# Patient Record
Sex: FEMALE | Race: BLACK OR AFRICAN AMERICAN
[De-identification: names, ages, dates, MRNs, and addresses within clinical notes are randomized per-mention and may not be internally consistent; named-entity substitution may affect disease eponyms.]

---

## 2017-05-04 ENCOUNTER — HOSPITAL ENCOUNTER (EMERGENCY)
Dept: HOSPITAL 74 - JER | Age: 37
Discharge: LEFT BEFORE DISCHARGE | End: 2017-05-04
Payer: COMMERCIAL

## 2017-05-04 VITALS — BODY MASS INDEX: 38.4 KG/M2

## 2017-05-04 VITALS — HEART RATE: 67 BPM | DIASTOLIC BLOOD PRESSURE: 112 MMHG | TEMPERATURE: 98 F | SYSTOLIC BLOOD PRESSURE: 150 MMHG

## 2017-05-04 DIAGNOSIS — Z53.21: Primary | ICD-10-CM

## 2017-10-08 ENCOUNTER — HOSPITAL ENCOUNTER (EMERGENCY)
Dept: HOSPITAL 74 - JERFT | Age: 37
Discharge: HOME | End: 2017-10-08
Payer: COMMERCIAL

## 2017-10-08 VITALS — HEART RATE: 77 BPM | TEMPERATURE: 98.1 F | DIASTOLIC BLOOD PRESSURE: 63 MMHG | SYSTOLIC BLOOD PRESSURE: 111 MMHG

## 2017-10-08 VITALS — BODY MASS INDEX: 34 KG/M2

## 2017-10-08 DIAGNOSIS — S13.4XXA: Primary | ICD-10-CM
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
## 2017-10-08 DIAGNOSIS — Z98.84: ICD-10-CM
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
## 2017-10-08 PROCEDURE — 3E0233Z INTRODUCTION OF ANTI-INFLAMMATORY INTO MUSCLE, PERCUTANEOUS APPROACH: ICD-10-PCS
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
## 2021-09-14 ENCOUNTER — HOSPITAL ENCOUNTER (INPATIENT)
Dept: HOSPITAL 74 - JER | Age: 41
LOS: 10 days | Discharge: HOME | DRG: 759 | End: 2021-09-24
Attending: INTERNAL MEDICINE | Admitting: HOSPITALIST
Payer: COMMERCIAL

## 2021-09-14 VITALS — BODY MASS INDEX: 38.5 KG/M2

## 2021-09-14 DIAGNOSIS — K80.20: ICD-10-CM

## 2021-09-14 DIAGNOSIS — D50.9: ICD-10-CM

## 2021-09-14 DIAGNOSIS — K44.9: ICD-10-CM

## 2021-09-14 DIAGNOSIS — B96.20: ICD-10-CM

## 2021-09-14 DIAGNOSIS — K59.09: ICD-10-CM

## 2021-09-14 DIAGNOSIS — Z98.84: ICD-10-CM

## 2021-09-14 DIAGNOSIS — D25.9: ICD-10-CM

## 2021-09-14 DIAGNOSIS — E05.90: ICD-10-CM

## 2021-09-14 DIAGNOSIS — N83.209: ICD-10-CM

## 2021-09-14 DIAGNOSIS — K63.89: ICD-10-CM

## 2021-09-14 DIAGNOSIS — N73.9: Primary | ICD-10-CM

## 2021-09-14 DIAGNOSIS — N94.9: ICD-10-CM

## 2021-09-14 LAB
ALBUMIN SERPL-MCNC: 3.2 G/DL (ref 3.4–5)
ALP SERPL-CCNC: 63 U/L (ref 45–117)
ALT SERPL-CCNC: 18 U/L (ref 13–61)
ANION GAP SERPL CALC-SCNC: 6 MMOL/L (ref 8–16)
APPEARANCE UR: CLEAR
AST SERPL-CCNC: 14 U/L (ref 15–37)
BASOPHILS # BLD: 1.1 % (ref 0–2)
BILIRUB SERPL-MCNC: 1.8 MG/DL (ref 0.2–1)
BILIRUB UR STRIP.AUTO-MCNC: NEGATIVE MG/DL
BUN SERPL-MCNC: 9.3 MG/DL (ref 7–18)
CALCIUM SERPL-MCNC: 8.6 MG/DL (ref 8.5–10.1)
CHLORIDE SERPL-SCNC: 110 MMOL/L (ref 98–107)
CO2 SERPL-SCNC: 24 MMOL/L (ref 21–32)
COLOR UR: YELLOW
CREAT SERPL-MCNC: 0.9 MG/DL (ref 0.55–1.3)
DEPRECATED RDW RBC AUTO: 16.6 % (ref 11.6–15.6)
EOSINOPHIL # BLD: 0 % (ref 0–4.5)
GLUCOSE SERPL-MCNC: 98 MG/DL (ref 74–106)
HCT VFR BLD CALC: 32 % (ref 32.4–45.2)
HGB BLD-MCNC: 10.3 GM/DL (ref 10.7–15.3)
KETONES UR QL STRIP: NEGATIVE
LEUKOCYTE ESTERASE UR QL STRIP.AUTO: NEGATIVE
LIPASE SERPL-CCNC: 54 U/L (ref 73–393)
LYMPHOCYTES # BLD: 4.4 % (ref 8–40)
MCH RBC QN AUTO: 23.8 PG (ref 25.7–33.7)
MCHC RBC AUTO-ENTMCNC: 32.3 G/DL (ref 32–36)
MCV RBC: 73.5 FL (ref 80–96)
MONOCYTES # BLD AUTO: 3.8 % (ref 3.8–10.2)
NEUTROPHILS # BLD: 90.7 % (ref 42.8–82.8)
NITRITE UR QL STRIP: NEGATIVE
PH UR: 6 [PH] (ref 5–8)
PLATELET # BLD AUTO: 264 10^3/UL (ref 134–434)
PMV BLD: 6.7 FL (ref 7.5–11.1)
PROT SERPL-MCNC: 7.2 G/DL (ref 6.4–8.2)
PROT UR QL STRIP: NEGATIVE
PROT UR QL STRIP: NEGATIVE
RBC # BLD AUTO: 4.35 M/MM3 (ref 3.6–5.2)
SODIUM SERPL-SCNC: 139 MMOL/L (ref 136–145)
SP GR UR: 1.01 (ref 1.01–1.03)
UROBILINOGEN UR STRIP-MCNC: 1 MG/DL (ref 0.2–1)
WBC # BLD AUTO: 10.7 K/MM3 (ref 4–10)

## 2021-09-14 PROCEDURE — C9803 HOPD COVID-19 SPEC COLLECT: HCPCS

## 2021-09-14 PROCEDURE — C1769 GUIDE WIRE: HCPCS

## 2021-09-14 PROCEDURE — U0005 INFEC AGEN DETEC AMPLI PROBE: HCPCS

## 2021-09-14 PROCEDURE — C1729 CATH, DRAINAGE: HCPCS

## 2021-09-14 PROCEDURE — U0003 INFECTIOUS AGENT DETECTION BY NUCLEIC ACID (DNA OR RNA); SEVERE ACUTE RESPIRATORY SYNDROME CORONAVIRUS 2 (SARS-COV-2) (CORONAVIRUS DISEASE [COVID-19]), AMPLIFIED PROBE TECHNIQUE, MAKING USE OF HIGH THROUGHPUT TECHNOLOGIES AS DESCRIBED BY CMS-2020-01-R: HCPCS

## 2021-09-14 RX ADMIN — MORPHINE SULFATE PRN MG: 2 INJECTION, SOLUTION INTRAMUSCULAR; INTRAVENOUS at 20:26

## 2021-09-14 RX ADMIN — SODIUM CHLORIDE SCH MLS/HR: 9 INJECTION, SOLUTION INTRAVENOUS at 17:55

## 2021-09-14 RX ADMIN — ENOXAPARIN SODIUM SCH: 40 INJECTION SUBCUTANEOUS at 18:44

## 2021-09-15 LAB
ALBUMIN SERPL-MCNC: 2.7 G/DL (ref 3.4–5)
ALP SERPL-CCNC: 59 U/L (ref 45–117)
ALT SERPL-CCNC: 16 U/L (ref 13–61)
ANION GAP SERPL CALC-SCNC: 9 MMOL/L (ref 8–16)
ANISOCYTOSIS BLD QL: (no result)
AST SERPL-CCNC: 15 U/L (ref 15–37)
BILIRUB SERPL-MCNC: 2 MG/DL (ref 0.2–1)
BUN SERPL-MCNC: 7.8 MG/DL (ref 7–18)
CALCIUM SERPL-MCNC: 7.8 MG/DL (ref 8.5–10.1)
CHLORIDE SERPL-SCNC: 108 MMOL/L (ref 98–107)
CO2 SERPL-SCNC: 21 MMOL/L (ref 21–32)
CREAT SERPL-MCNC: 0.7 MG/DL (ref 0.55–1.3)
DEPRECATED RDW RBC AUTO: 17 % (ref 11.6–15.6)
ERYTHROCYTE [SEDIMENTATION RATE] IN BLOOD BY WESTERGREN METHOD: 40 MM/HR (ref 0–20)
GLUCOSE SERPL-MCNC: 104 MG/DL (ref 74–106)
HCT VFR BLD CALC: 31 % (ref 32.4–45.2)
HGB BLD-MCNC: 9.7 GM/DL (ref 10.7–15.3)
IRON SERPL-MCNC: 5 UG/DL (ref 50–175)
MACROCYTES BLD QL: 0
MAGNESIUM SERPL-MCNC: 2.2 MG/DL (ref 1.8–2.4)
MCH RBC QN AUTO: 23.1 PG (ref 25.7–33.7)
MCHC RBC AUTO-ENTMCNC: 31.2 G/DL (ref 32–36)
MCV RBC: 74.2 FL (ref 80–96)
PHOSPHATE SERPL-MCNC: 2.8 MG/DL (ref 2.5–4.9)
PLATELET # BLD AUTO: 261 10^3/UL (ref 134–434)
PLATELET BLD QL SMEAR: NORMAL
PMV BLD: 7.5 FL (ref 7.5–11.1)
PROT SERPL-MCNC: 6.3 G/DL (ref 6.4–8.2)
RBC # BLD AUTO: 4.17 M/MM3 (ref 3.6–5.2)
SODIUM SERPL-SCNC: 138 MMOL/L (ref 136–145)
TIBC SERPL-MCNC: 307 UG/DL (ref 250–450)
WBC # BLD AUTO: 14.5 K/MM3 (ref 4–10)

## 2021-09-15 RX ADMIN — Medication PRN MG: at 21:40

## 2021-09-15 RX ADMIN — MORPHINE SULFATE PRN MG: 2 INJECTION, SOLUTION INTRAMUSCULAR; INTRAVENOUS at 09:03

## 2021-09-15 RX ADMIN — SODIUM CHLORIDE SCH MLS/HR: 9 INJECTION, SOLUTION INTRAVENOUS at 13:22

## 2021-09-15 RX ADMIN — MORPHINE SULFATE PRN MG: 2 INJECTION, SOLUTION INTRAMUSCULAR; INTRAVENOUS at 03:32

## 2021-09-15 RX ADMIN — SODIUM CHLORIDE SCH: 9 INJECTION, SOLUTION INTRAVENOUS at 18:39

## 2021-09-15 RX ADMIN — MORPHINE SULFATE PRN MG: 2 INJECTION, SOLUTION INTRAMUSCULAR; INTRAVENOUS at 17:11

## 2021-09-15 RX ADMIN — CEFTRIAXONE SODIUM SCH MLS/HR: 2 INJECTION, POWDER, FOR SOLUTION INTRAMUSCULAR; INTRAVENOUS at 16:29

## 2021-09-15 RX ADMIN — ACETAMINOPHEN PRN MG: 325 TABLET ORAL at 02:12

## 2021-09-15 RX ADMIN — MORPHINE SULFATE PRN MG: 2 INJECTION, SOLUTION INTRAMUSCULAR; INTRAVENOUS at 13:19

## 2021-09-15 RX ADMIN — ENOXAPARIN SODIUM SCH MG: 40 INJECTION SUBCUTANEOUS at 09:08

## 2021-09-15 RX ADMIN — MORPHINE SULFATE PRN MG: 2 INJECTION, SOLUTION INTRAMUSCULAR; INTRAVENOUS at 21:38

## 2021-09-16 LAB
ALBUMIN SERPL-MCNC: 2.3 G/DL (ref 3.4–5)
ALP SERPL-CCNC: 65 U/L (ref 45–117)
ALT SERPL-CCNC: 15 U/L (ref 13–61)
ANION GAP SERPL CALC-SCNC: 7 MMOL/L (ref 8–16)
AST SERPL-CCNC: 13 U/L (ref 15–37)
BILIRUB DIRECT SERPL-MCNC: 136 U/L (ref 84–246)
BILIRUB SERPL-MCNC: 1.4 MG/DL (ref 0.2–1)
BUN SERPL-MCNC: 6.2 MG/DL (ref 7–18)
CALCIUM SERPL-MCNC: 7.8 MG/DL (ref 8.5–10.1)
CHLORIDE SERPL-SCNC: 108 MMOL/L (ref 98–107)
CO2 SERPL-SCNC: 23 MMOL/L (ref 21–32)
CREAT SERPL-MCNC: 0.7 MG/DL (ref 0.55–1.3)
DEPRECATED RDW RBC AUTO: 16.8 % (ref 11.6–15.6)
GLUCOSE SERPL-MCNC: 82 MG/DL (ref 74–106)
HCT VFR BLD CALC: 27.5 % (ref 32.4–45.2)
HGB BLD-MCNC: 8.8 GM/DL (ref 10.7–15.3)
MCH RBC QN AUTO: 23.5 PG (ref 25.7–33.7)
MCHC RBC AUTO-ENTMCNC: 32 G/DL (ref 32–36)
MCV RBC: 73.3 FL (ref 80–96)
PLATELET # BLD AUTO: 243 10^3/UL (ref 134–434)
PMV BLD: 7.5 FL (ref 7.5–11.1)
PROT SERPL-MCNC: 5.9 G/DL (ref 6.4–8.2)
RBC # BLD AUTO: 3.75 M/MM3 (ref 3.6–5.2)
SODIUM SERPL-SCNC: 138 MMOL/L (ref 136–145)
WBC # BLD AUTO: 17.9 K/MM3 (ref 4–10)

## 2021-09-16 RX ADMIN — MORPHINE SULFATE PRN MG: 2 INJECTION, SOLUTION INTRAMUSCULAR; INTRAVENOUS at 14:26

## 2021-09-16 RX ADMIN — MORPHINE SULFATE PRN MG: 2 INJECTION, SOLUTION INTRAMUSCULAR; INTRAVENOUS at 02:02

## 2021-09-16 RX ADMIN — ACETAMINOPHEN PRN MG: 325 TABLET ORAL at 21:31

## 2021-09-16 RX ADMIN — ENOXAPARIN SODIUM SCH: 40 INJECTION SUBCUTANEOUS at 10:30

## 2021-09-16 RX ADMIN — Medication PRN MG: at 21:31

## 2021-09-16 RX ADMIN — SODIUM CHLORIDE SCH MLS/HR: 9 INJECTION, SOLUTION INTRAVENOUS at 03:03

## 2021-09-16 RX ADMIN — SODIUM CHLORIDE SCH MLS/HR: 9 INJECTION, SOLUTION INTRAVENOUS at 15:54

## 2021-09-16 RX ADMIN — MORPHINE SULFATE PRN MG: 2 INJECTION, SOLUTION INTRAMUSCULAR; INTRAVENOUS at 08:13

## 2021-09-16 RX ADMIN — CEFTRIAXONE SODIUM SCH MLS/HR: 2 INJECTION, POWDER, FOR SOLUTION INTRAMUSCULAR; INTRAVENOUS at 10:20

## 2021-09-17 LAB
ALBUMIN SERPL-MCNC: 2.3 G/DL (ref 3.4–5)
ALP SERPL-CCNC: 72 U/L (ref 45–117)
ALT SERPL-CCNC: 17 U/L (ref 13–61)
ANION GAP SERPL CALC-SCNC: 6 MMOL/L (ref 8–16)
AST SERPL-CCNC: 16 U/L (ref 15–37)
BILIRUB SERPL-MCNC: 1.1 MG/DL (ref 0.2–1)
BUN SERPL-MCNC: 4.8 MG/DL (ref 7–18)
CALCIUM SERPL-MCNC: 7.5 MG/DL (ref 8.5–10.1)
CHLORIDE SERPL-SCNC: 110 MMOL/L (ref 98–107)
CO2 SERPL-SCNC: 26 MMOL/L (ref 21–32)
CREAT SERPL-MCNC: 0.5 MG/DL (ref 0.55–1.3)
DEPRECATED RDW RBC AUTO: 16.6 % (ref 11.6–15.6)
GLUCOSE SERPL-MCNC: 73 MG/DL (ref 74–106)
HCT VFR BLD CALC: 26.5 % (ref 32.4–45.2)
HGB BLD-MCNC: 8.6 GM/DL (ref 10.7–15.3)
MCH RBC QN AUTO: 23.7 PG (ref 25.7–33.7)
MCHC RBC AUTO-ENTMCNC: 32.4 G/DL (ref 32–36)
MCV RBC: 73 FL (ref 80–96)
PLATELET # BLD AUTO: 268 10^3/UL (ref 134–434)
PMV BLD: 7.3 FL (ref 7.5–11.1)
PROT SERPL-MCNC: 5.8 G/DL (ref 6.4–8.2)
RBC # BLD AUTO: 3.63 M/MM3 (ref 3.6–5.2)
SODIUM SERPL-SCNC: 141 MMOL/L (ref 136–145)
WBC # BLD AUTO: 15 K/MM3 (ref 4–10)

## 2021-09-17 RX ADMIN — MORPHINE SULFATE PRN MG: 2 INJECTION, SOLUTION INTRAMUSCULAR; INTRAVENOUS at 04:30

## 2021-09-17 RX ADMIN — POTASSIUM CHLORIDE SCH MLS/HR: 7.46 INJECTION, SOLUTION INTRAVENOUS at 15:57

## 2021-09-17 RX ADMIN — Medication PRN MG: at 22:11

## 2021-09-17 RX ADMIN — ENOXAPARIN SODIUM SCH MG: 40 INJECTION SUBCUTANEOUS at 10:11

## 2021-09-17 RX ADMIN — MORPHINE SULFATE PRN MG: 2 INJECTION, SOLUTION INTRAMUSCULAR; INTRAVENOUS at 22:11

## 2021-09-17 RX ADMIN — POTASSIUM CHLORIDE SCH: 7.46 INJECTION, SOLUTION INTRAVENOUS at 17:05

## 2021-09-17 RX ADMIN — CEFTRIAXONE SODIUM SCH MLS/HR: 2 INJECTION, POWDER, FOR SOLUTION INTRAMUSCULAR; INTRAVENOUS at 10:11

## 2021-09-18 LAB
ANION GAP SERPL CALC-SCNC: 6 MMOL/L (ref 8–16)
BUN SERPL-MCNC: 4.9 MG/DL (ref 7–18)
CALCIUM SERPL-MCNC: 7.6 MG/DL (ref 8.5–10.1)
CHLORIDE SERPL-SCNC: 108 MMOL/L (ref 98–107)
CO2 SERPL-SCNC: 28 MMOL/L (ref 21–32)
CREAT SERPL-MCNC: 0.6 MG/DL (ref 0.55–1.3)
DEPRECATED RDW RBC AUTO: 16.7 % (ref 11.6–15.6)
GLUCOSE SERPL-MCNC: 75 MG/DL (ref 74–106)
HCT VFR BLD CALC: 27.6 % (ref 32.4–45.2)
HGB BLD-MCNC: 8.8 GM/DL (ref 10.7–15.3)
MCH RBC QN AUTO: 23.2 PG (ref 25.7–33.7)
MCHC RBC AUTO-ENTMCNC: 32 G/DL (ref 32–36)
MCV RBC: 72.5 FL (ref 80–96)
PLATELET # BLD AUTO: 284 10^3/UL (ref 134–434)
PMV BLD: 7.1 FL (ref 7.5–11.1)
RBC # BLD AUTO: 3.81 M/MM3 (ref 3.6–5.2)
SODIUM SERPL-SCNC: 141 MMOL/L (ref 136–145)
WBC # BLD AUTO: 13.6 K/MM3 (ref 4–10)

## 2021-09-18 RX ADMIN — ACETAMINOPHEN PRN MG: 325 TABLET ORAL at 04:48

## 2021-09-18 RX ADMIN — ACETAMINOPHEN PRN MG: 325 TABLET ORAL at 14:09

## 2021-09-18 RX ADMIN — CEFTRIAXONE SODIUM SCH MLS/HR: 2 INJECTION, POWDER, FOR SOLUTION INTRAMUSCULAR; INTRAVENOUS at 11:23

## 2021-09-19 LAB
ANION GAP SERPL CALC-SCNC: 4 MMOL/L (ref 8–16)
BUN SERPL-MCNC: 6.9 MG/DL (ref 7–18)
CALCIUM SERPL-MCNC: 7.6 MG/DL (ref 8.5–10.1)
CHLORIDE SERPL-SCNC: 109 MMOL/L (ref 98–107)
CO2 SERPL-SCNC: 27 MMOL/L (ref 21–32)
CREAT SERPL-MCNC: 0.5 MG/DL (ref 0.55–1.3)
DEPRECATED RDW RBC AUTO: 16.6 % (ref 11.6–15.6)
GLUCOSE SERPL-MCNC: 80 MG/DL (ref 74–106)
HCT VFR BLD CALC: 27.3 % (ref 32.4–45.2)
HGB BLD-MCNC: 8.8 GM/DL (ref 10.7–15.3)
MCH RBC QN AUTO: 23.1 PG (ref 25.7–33.7)
MCHC RBC AUTO-ENTMCNC: 32.4 G/DL (ref 32–36)
MCV RBC: 71.4 FL (ref 80–96)
PLATELET # BLD AUTO: 299 10^3/UL (ref 134–434)
PMV BLD: 6.8 FL (ref 7.5–11.1)
RBC # BLD AUTO: 3.82 M/MM3 (ref 3.6–5.2)
SODIUM SERPL-SCNC: 139 MMOL/L (ref 136–145)
WBC # BLD AUTO: 13.2 K/MM3 (ref 4–10)

## 2021-09-19 RX ADMIN — ACETAMINOPHEN PRN MG: 325 TABLET ORAL at 00:32

## 2021-09-19 RX ADMIN — Medication SCH TAB: at 21:42

## 2021-09-19 RX ADMIN — Medication SCH TAB: at 19:07

## 2021-09-19 RX ADMIN — ACETAMINOPHEN PRN MG: 325 TABLET ORAL at 23:35

## 2021-09-19 RX ADMIN — CEFTRIAXONE SODIUM SCH MLS/HR: 2 INJECTION, POWDER, FOR SOLUTION INTRAMUSCULAR; INTRAVENOUS at 09:23

## 2021-09-19 RX ADMIN — Medication PRN MG: at 00:32

## 2021-09-19 RX ADMIN — Medication PRN MG: at 23:35

## 2021-09-19 RX ADMIN — ACETAMINOPHEN PRN MG: 325 TABLET ORAL at 18:38

## 2021-09-20 LAB
ALBUMIN SERPL-MCNC: 2.3 G/DL (ref 3.4–5)
ALP SERPL-CCNC: 75 U/L (ref 45–117)
ALT SERPL-CCNC: 26 U/L (ref 13–61)
ANION GAP SERPL CALC-SCNC: 8 MMOL/L (ref 8–16)
AST SERPL-CCNC: 38 U/L (ref 15–37)
BASOPHILS # BLD: 0.3 % (ref 0–2)
BILIRUB SERPL-MCNC: 0.4 MG/DL (ref 0.2–1)
BUN SERPL-MCNC: 4.7 MG/DL (ref 7–18)
CALCIUM SERPL-MCNC: 7.9 MG/DL (ref 8.5–10.1)
CHLORIDE SERPL-SCNC: 108 MMOL/L (ref 98–107)
CO2 SERPL-SCNC: 24 MMOL/L (ref 21–32)
CREAT SERPL-MCNC: 0.5 MG/DL (ref 0.55–1.3)
DEPRECATED RDW RBC AUTO: 16.7 % (ref 11.6–15.6)
EOSINOPHIL # BLD: 0.8 % (ref 0–4.5)
GLUCOSE SERPL-MCNC: 82 MG/DL (ref 74–106)
HCT VFR BLD CALC: 28.3 % (ref 32.4–45.2)
HGB BLD-MCNC: 9.1 GM/DL (ref 10.7–15.3)
LYMPHOCYTES # BLD: 9.9 % (ref 8–40)
MAGNESIUM SERPL-MCNC: 2.1 MG/DL (ref 1.8–2.4)
MCH RBC QN AUTO: 23.2 PG (ref 25.7–33.7)
MCHC RBC AUTO-ENTMCNC: 32.3 G/DL (ref 32–36)
MCV RBC: 71.9 FL (ref 80–96)
MONOCYTES # BLD AUTO: 9.7 % (ref 3.8–10.2)
NEUTROPHILS # BLD: 79.3 % (ref 42.8–82.8)
PHOSPHATE SERPL-MCNC: 3 MG/DL (ref 2.5–4.9)
PLATELET # BLD AUTO: 369 10^3/UL (ref 134–434)
PMV BLD: 7.1 FL (ref 7.5–11.1)
PROT SERPL-MCNC: 6.1 G/DL (ref 6.4–8.2)
RBC # BLD AUTO: 3.94 M/MM3 (ref 3.6–5.2)
SODIUM SERPL-SCNC: 140 MMOL/L (ref 136–145)
WBC # BLD AUTO: 12.4 K/MM3 (ref 4–10)

## 2021-09-20 PROCEDURE — 0W9J3ZZ DRAINAGE OF PELVIC CAVITY, PERCUTANEOUS APPROACH: ICD-10-PCS | Performed by: RADIOLOGY

## 2021-09-20 RX ADMIN — POTASSIUM CHLORIDE SCH: 149 INJECTION, SOLUTION, CONCENTRATE INTRAVENOUS at 21:48

## 2021-09-20 RX ADMIN — Medication SCH TAB: at 10:04

## 2021-09-20 RX ADMIN — CEFTRIAXONE SODIUM SCH MLS/HR: 2 INJECTION, POWDER, FOR SOLUTION INTRAMUSCULAR; INTRAVENOUS at 17:32

## 2021-09-20 RX ADMIN — POTASSIUM CHLORIDE SCH MLS/HR: 149 INJECTION, SOLUTION, CONCENTRATE INTRAVENOUS at 17:32

## 2021-09-20 RX ADMIN — MORPHINE SULFATE PRN MG: 2 INJECTION, SOLUTION INTRAMUSCULAR; INTRAVENOUS at 18:03

## 2021-09-21 LAB
ALBUMIN SERPL-MCNC: 2.2 G/DL (ref 3.4–5)
ALP SERPL-CCNC: 114 U/L (ref 45–117)
ALT SERPL-CCNC: 51 U/L (ref 13–61)
ANION GAP SERPL CALC-SCNC: 5 MMOL/L (ref 8–16)
ANISOCYTOSIS BLD QL: (no result)
ANISOCYTOSIS BLD QL: (no result)
AST SERPL-CCNC: 76 U/L (ref 15–37)
BILIRUB SERPL-MCNC: 0.4 MG/DL (ref 0.2–1)
BUN SERPL-MCNC: 5.3 MG/DL (ref 7–18)
CALCIUM SERPL-MCNC: 7.8 MG/DL (ref 8.5–10.1)
CHLORIDE SERPL-SCNC: 106 MMOL/L (ref 98–107)
CO2 SERPL-SCNC: 29 MMOL/L (ref 21–32)
CREAT SERPL-MCNC: 0.5 MG/DL (ref 0.55–1.3)
DEPRECATED RDW RBC AUTO: 16.6 % (ref 11.6–15.6)
DEPRECATED RDW RBC AUTO: 16.7 % (ref 11.6–15.6)
GLUCOSE SERPL-MCNC: 78 MG/DL (ref 74–106)
HCT VFR BLD CALC: 27.6 % (ref 32.4–45.2)
HCT VFR BLD CALC: 29.9 % (ref 32.4–45.2)
HGB BLD-MCNC: 8.8 GM/DL (ref 10.7–15.3)
HGB BLD-MCNC: 9.6 GM/DL (ref 10.7–15.3)
MACROCYTES BLD QL: (no result)
MACROCYTES BLD QL: (no result)
MAGNESIUM SERPL-MCNC: 2.1 MG/DL (ref 1.8–2.4)
MCH RBC QN AUTO: 22.7 PG (ref 25.7–33.7)
MCH RBC QN AUTO: 23.1 PG (ref 25.7–33.7)
MCHC RBC AUTO-ENTMCNC: 31.8 G/DL (ref 32–36)
MCHC RBC AUTO-ENTMCNC: 32.1 G/DL (ref 32–36)
MCV RBC: 71.5 FL (ref 80–96)
MCV RBC: 71.8 FL (ref 80–96)
OVALOCYTES BLD QL SMEAR: (no result)
PHOSPHATE SERPL-MCNC: 3 MG/DL (ref 2.5–4.9)
PLATELET # BLD AUTO: 351 10^3/UL (ref 134–434)
PLATELET # BLD AUTO: 355 10^3/UL (ref 134–434)
PLATELET BLD QL SMEAR: NORMAL
PLATELET BLD QL SMEAR: NORMAL
PMV BLD: 6.9 FL (ref 7.5–11.1)
PMV BLD: 6.9 FL (ref 7.5–11.1)
PROT SERPL-MCNC: 6 G/DL (ref 6.4–8.2)
RBC # BLD AUTO: 3.86 M/MM3 (ref 3.6–5.2)
RBC # BLD AUTO: 4.17 M/MM3 (ref 3.6–5.2)
ROULEAUX BLD QL SMEAR: (no result)
SODIUM SERPL-SCNC: 140 MMOL/L (ref 136–145)
WBC # BLD AUTO: 17.8 K/MM3 (ref 4–10)
WBC # BLD AUTO: 19.3 K/MM3 (ref 4–10)

## 2021-09-21 RX ADMIN — Medication PRN MG: at 21:25

## 2021-09-21 RX ADMIN — Medication SCH TAB: at 09:31

## 2021-09-21 RX ADMIN — Medication PRN MG: at 00:01

## 2021-09-21 RX ADMIN — Medication SCH TAB: at 00:00

## 2021-09-21 RX ADMIN — CEFTRIAXONE SODIUM SCH MLS/HR: 2 INJECTION, POWDER, FOR SOLUTION INTRAMUSCULAR; INTRAVENOUS at 09:31

## 2021-09-21 RX ADMIN — Medication SCH TAB: at 21:24

## 2021-09-21 RX ADMIN — ACETAMINOPHEN PRN MG: 325 TABLET ORAL at 00:01

## 2021-09-21 RX ADMIN — POTASSIUM CHLORIDE SCH: 149 INJECTION, SOLUTION, CONCENTRATE INTRAVENOUS at 18:24

## 2021-09-22 LAB
ALBUMIN SERPL-MCNC: 2.1 G/DL (ref 3.4–5)
ALP SERPL-CCNC: 94 U/L (ref 45–117)
ALT SERPL-CCNC: 69 U/L (ref 13–61)
ANION GAP SERPL CALC-SCNC: 5 MMOL/L (ref 8–16)
AST SERPL-CCNC: 94 U/L (ref 15–37)
BASOPHILS # BLD: 0.4 % (ref 0–2)
BILIRUB SERPL-MCNC: 0.3 MG/DL (ref 0.2–1)
BUN SERPL-MCNC: 5.7 MG/DL (ref 7–18)
CALCIUM SERPL-MCNC: 7.8 MG/DL (ref 8.5–10.1)
CHLORIDE SERPL-SCNC: 108 MMOL/L (ref 98–107)
CO2 SERPL-SCNC: 26 MMOL/L (ref 21–32)
CREAT SERPL-MCNC: 0.6 MG/DL (ref 0.55–1.3)
DEPRECATED RDW RBC AUTO: 17.1 % (ref 11.6–15.6)
EOSINOPHIL # BLD: 0.8 % (ref 0–4.5)
GLUCOSE SERPL-MCNC: 79 MG/DL (ref 74–106)
HCT VFR BLD CALC: 26.1 % (ref 32.4–45.2)
HGB BLD-MCNC: 8.4 GM/DL (ref 10.7–15.3)
LYMPHOCYTES # BLD: 11.2 % (ref 8–40)
MAGNESIUM SERPL-MCNC: 2.3 MG/DL (ref 1.8–2.4)
MCH RBC QN AUTO: 23.2 PG (ref 25.7–33.7)
MCHC RBC AUTO-ENTMCNC: 32.3 G/DL (ref 32–36)
MCV RBC: 71.8 FL (ref 80–96)
MONOCYTES # BLD AUTO: 9.1 % (ref 3.8–10.2)
NEUTROPHILS # BLD: 78.5 % (ref 42.8–82.8)
PHOSPHATE SERPL-MCNC: 3.6 MG/DL (ref 2.5–4.9)
PLATELET # BLD AUTO: 360 10^3/UL (ref 134–434)
PMV BLD: 7 FL (ref 7.5–11.1)
PROT SERPL-MCNC: 5.9 G/DL (ref 6.4–8.2)
RBC # BLD AUTO: 3.64 M/MM3 (ref 3.6–5.2)
SODIUM SERPL-SCNC: 140 MMOL/L (ref 136–145)
WBC # BLD AUTO: 12.7 K/MM3 (ref 4–10)

## 2021-09-22 RX ADMIN — Medication SCH TAB: at 09:39

## 2021-09-22 RX ADMIN — SODIUM CHLORIDE SCH MLS/HR: 9 INJECTION, SOLUTION INTRAVENOUS at 12:14

## 2021-09-22 RX ADMIN — Medication SCH TAB: at 21:11

## 2021-09-22 RX ADMIN — Medication PRN MG: at 21:11

## 2021-09-22 RX ADMIN — CEFTRIAXONE SODIUM SCH MLS/HR: 2 INJECTION, POWDER, FOR SOLUTION INTRAMUSCULAR; INTRAVENOUS at 10:45

## 2021-09-22 RX ADMIN — POTASSIUM CHLORIDE SCH MLS/HR: 149 INJECTION, SOLUTION, CONCENTRATE INTRAVENOUS at 03:03

## 2021-09-23 LAB
ALBUMIN SERPL-MCNC: 2.2 G/DL (ref 3.4–5)
ALP SERPL-CCNC: 82 U/L (ref 45–117)
ALT SERPL-CCNC: 52 U/L (ref 13–61)
ANION GAP SERPL CALC-SCNC: 5 MMOL/L (ref 8–16)
AST SERPL-CCNC: 33 U/L (ref 15–37)
BASOPHILS # BLD: 0.3 % (ref 0–2)
BILIRUB SERPL-MCNC: 0.3 MG/DL (ref 0.2–1)
BUN SERPL-MCNC: 5.9 MG/DL (ref 7–18)
CALCIUM SERPL-MCNC: 7.9 MG/DL (ref 8.5–10.1)
CHLORIDE SERPL-SCNC: 110 MMOL/L (ref 98–107)
CO2 SERPL-SCNC: 25 MMOL/L (ref 21–32)
CREAT SERPL-MCNC: 0.6 MG/DL (ref 0.55–1.3)
DEPRECATED RDW RBC AUTO: 16.9 % (ref 11.6–15.6)
EOSINOPHIL # BLD: 1 % (ref 0–4.5)
GLUCOSE SERPL-MCNC: 92 MG/DL (ref 74–106)
HCT VFR BLD CALC: 27.1 % (ref 32.4–45.2)
HGB BLD-MCNC: 8.8 GM/DL (ref 10.7–15.3)
LYMPHOCYTES # BLD: 15.3 % (ref 8–40)
MAGNESIUM SERPL-MCNC: 2.3 MG/DL (ref 1.8–2.4)
MCH RBC QN AUTO: 23 PG (ref 25.7–33.7)
MCHC RBC AUTO-ENTMCNC: 32.3 G/DL (ref 32–36)
MCV RBC: 71.3 FL (ref 80–96)
MONOCYTES # BLD AUTO: 8.2 % (ref 3.8–10.2)
NEUTROPHILS # BLD: 75.2 % (ref 42.8–82.8)
PHOSPHATE SERPL-MCNC: 3 MG/DL (ref 2.5–4.9)
PLATELET # BLD AUTO: 408 10^3/UL (ref 134–434)
PMV BLD: 6.5 FL (ref 7.5–11.1)
PROT SERPL-MCNC: 6.2 G/DL (ref 6.4–8.2)
RBC # BLD AUTO: 3.8 M/MM3 (ref 3.6–5.2)
SODIUM SERPL-SCNC: 141 MMOL/L (ref 136–145)
WBC # BLD AUTO: 9.6 K/MM3 (ref 4–10)

## 2021-09-23 RX ADMIN — Medication SCH TAB: at 09:54

## 2021-09-23 RX ADMIN — Medication SCH TAB: at 21:32

## 2021-09-23 RX ADMIN — SODIUM CHLORIDE SCH MLS/HR: 9 INJECTION, SOLUTION INTRAVENOUS at 00:00

## 2021-09-23 RX ADMIN — CEFTRIAXONE SODIUM SCH MLS/HR: 2 INJECTION, POWDER, FOR SOLUTION INTRAMUSCULAR; INTRAVENOUS at 09:56

## 2021-09-23 RX ADMIN — SODIUM CHLORIDE SCH: 9 INJECTION, SOLUTION INTRAVENOUS at 17:06

## 2021-09-24 VITALS — DIASTOLIC BLOOD PRESSURE: 74 MMHG | SYSTOLIC BLOOD PRESSURE: 128 MMHG | HEART RATE: 62 BPM

## 2021-09-24 VITALS — TEMPERATURE: 98.5 F

## 2021-09-24 LAB
ALBUMIN SERPL-MCNC: 2.2 G/DL (ref 3.4–5)
ALP SERPL-CCNC: 70 U/L (ref 45–117)
ALT SERPL-CCNC: 42 U/L (ref 13–61)
ANION GAP SERPL CALC-SCNC: 5 MMOL/L (ref 8–16)
AST SERPL-CCNC: 24 U/L (ref 15–37)
BASOPHILS # BLD: 1 % (ref 0–2)
BILIRUB SERPL-MCNC: 0.2 MG/DL (ref 0.2–1)
BUN SERPL-MCNC: 6.3 MG/DL (ref 7–18)
CALCIUM SERPL-MCNC: 8.3 MG/DL (ref 8.5–10.1)
CHLORIDE SERPL-SCNC: 109 MMOL/L (ref 98–107)
CO2 SERPL-SCNC: 27 MMOL/L (ref 21–32)
CREAT SERPL-MCNC: 0.6 MG/DL (ref 0.55–1.3)
DEPRECATED RDW RBC AUTO: 16.9 % (ref 11.6–15.6)
EOSINOPHIL # BLD: 1.5 % (ref 0–4.5)
GLUCOSE SERPL-MCNC: 78 MG/DL (ref 74–106)
HCT VFR BLD CALC: 26.6 % (ref 32.4–45.2)
HGB BLD-MCNC: 8.5 GM/DL (ref 10.7–15.3)
LYMPHOCYTES # BLD: 20.6 % (ref 8–40)
MAGNESIUM SERPL-MCNC: 2.3 MG/DL (ref 1.8–2.4)
MCH RBC QN AUTO: 23 PG (ref 25.7–33.7)
MCHC RBC AUTO-ENTMCNC: 32 G/DL (ref 32–36)
MCV RBC: 71.8 FL (ref 80–96)
MONOCYTES # BLD AUTO: 8.6 % (ref 3.8–10.2)
NEUTROPHILS # BLD: 68.3 % (ref 42.8–82.8)
PHOSPHATE SERPL-MCNC: 3.9 MG/DL (ref 2.5–4.9)
PLATELET # BLD AUTO: 419 10^3/UL (ref 134–434)
PMV BLD: 6.8 FL (ref 7.5–11.1)
PROT SERPL-MCNC: 6.2 G/DL (ref 6.4–8.2)
RBC # BLD AUTO: 3.71 M/MM3 (ref 3.6–5.2)
SODIUM SERPL-SCNC: 141 MMOL/L (ref 136–145)
WBC # BLD AUTO: 7.4 K/MM3 (ref 4–10)

## 2021-09-24 RX ADMIN — Medication SCH TAB: at 09:29

## 2021-09-24 RX ADMIN — CEFTRIAXONE SODIUM SCH MLS/HR: 2 INJECTION, POWDER, FOR SOLUTION INTRAMUSCULAR; INTRAVENOUS at 09:29

## 2021-09-24 RX ADMIN — Medication PRN MG: at 01:37

## 2021-10-05 ENCOUNTER — HOSPITAL ENCOUNTER (OUTPATIENT)
Dept: HOSPITAL 74 - JRADIR | Age: 41
Discharge: HOME | End: 2021-10-05
Attending: RADIOLOGY
Payer: COMMERCIAL

## 2021-10-05 DIAGNOSIS — K65.1: Primary | ICD-10-CM

## 2021-10-05 PROCEDURE — BW11YZZ FLUOROSCOPY OF ABDOMEN AND PELVIS USING OTHER CONTRAST: ICD-10-PCS | Performed by: RADIOLOGY

## 2022-07-29 ENCOUNTER — OUTPATIENT (OUTPATIENT)
Dept: OUTPATIENT SERVICES | Facility: HOSPITAL | Age: 42
LOS: 1 days | End: 2022-07-29
Payer: COMMERCIAL

## 2022-07-29 VITALS
HEART RATE: 72 BPM | RESPIRATION RATE: 14 BRPM | DIASTOLIC BLOOD PRESSURE: 81 MMHG | HEIGHT: 68 IN | TEMPERATURE: 97 F | SYSTOLIC BLOOD PRESSURE: 116 MMHG | OXYGEN SATURATION: 100 % | WEIGHT: 238.98 LBS

## 2022-07-29 DIAGNOSIS — Z98.890 OTHER SPECIFIED POSTPROCEDURAL STATES: Chronic | ICD-10-CM

## 2022-07-29 DIAGNOSIS — Z01.818 ENCOUNTER FOR OTHER PREPROCEDURAL EXAMINATION: ICD-10-CM

## 2022-07-29 DIAGNOSIS — L98.7 EXCESSIVE AND REDUNDANT SKIN AND SUBCUTANEOUS TISSUE: ICD-10-CM

## 2022-07-29 DIAGNOSIS — Z90.3 ACQUIRED ABSENCE OF STOMACH [PART OF]: Chronic | ICD-10-CM

## 2022-07-29 LAB
ANION GAP SERPL CALC-SCNC: 8 MMOL/L — SIGNIFICANT CHANGE UP (ref 5–17)
APTT BLD: 28 SEC — SIGNIFICANT CHANGE UP (ref 27.5–35.5)
BUN SERPL-MCNC: 16 MG/DL — SIGNIFICANT CHANGE UP (ref 7–23)
CALCIUM SERPL-MCNC: 9.1 MG/DL — SIGNIFICANT CHANGE UP (ref 8.5–10.1)
CHLORIDE SERPL-SCNC: 111 MMOL/L — HIGH (ref 96–108)
CO2 SERPL-SCNC: 23 MMOL/L — SIGNIFICANT CHANGE UP (ref 22–31)
CREAT SERPL-MCNC: 0.7 MG/DL — SIGNIFICANT CHANGE UP (ref 0.5–1.3)
EGFR: 110 ML/MIN/1.73M2 — SIGNIFICANT CHANGE UP
GLUCOSE SERPL-MCNC: 82 MG/DL — SIGNIFICANT CHANGE UP (ref 70–99)
HCG SERPL-ACNC: <1 MIU/ML — SIGNIFICANT CHANGE UP
HCT VFR BLD CALC: 35.3 % — SIGNIFICANT CHANGE UP (ref 34.5–45)
HGB BLD-MCNC: 10.3 G/DL — LOW (ref 11.5–15.5)
INR BLD: 1.01 RATIO — SIGNIFICANT CHANGE UP (ref 0.88–1.16)
MCHC RBC-ENTMCNC: 22.4 PG — LOW (ref 27–34)
MCHC RBC-ENTMCNC: 29.2 GM/DL — LOW (ref 32–36)
MCV RBC AUTO: 76.7 FL — LOW (ref 80–100)
NRBC # BLD: 0 /100 WBCS — SIGNIFICANT CHANGE UP (ref 0–0)
PLATELET # BLD AUTO: 316 K/UL — SIGNIFICANT CHANGE UP (ref 150–400)
POTASSIUM SERPL-MCNC: 4.3 MMOL/L — SIGNIFICANT CHANGE UP (ref 3.5–5.3)
POTASSIUM SERPL-SCNC: 4.3 MMOL/L — SIGNIFICANT CHANGE UP (ref 3.5–5.3)
PROTHROM AB SERPL-ACNC: 11.8 SEC — SIGNIFICANT CHANGE UP (ref 10.5–13.4)
RBC # BLD: 4.6 M/UL — SIGNIFICANT CHANGE UP (ref 3.8–5.2)
RBC # FLD: 16.7 % — HIGH (ref 10.3–14.5)
SARS-COV-2 RNA SPEC QL NAA+PROBE: SIGNIFICANT CHANGE UP
SODIUM SERPL-SCNC: 142 MMOL/L — SIGNIFICANT CHANGE UP (ref 135–145)
WBC # BLD: 4.58 K/UL — SIGNIFICANT CHANGE UP (ref 3.8–10.5)
WBC # FLD AUTO: 4.58 K/UL — SIGNIFICANT CHANGE UP (ref 3.8–10.5)

## 2022-07-29 PROCEDURE — 84702 CHORIONIC GONADOTROPIN TEST: CPT

## 2022-07-29 PROCEDURE — 85610 PROTHROMBIN TIME: CPT

## 2022-07-29 PROCEDURE — U0005: CPT

## 2022-07-29 PROCEDURE — 36415 COLL VENOUS BLD VENIPUNCTURE: CPT

## 2022-07-29 PROCEDURE — 85027 COMPLETE CBC AUTOMATED: CPT

## 2022-07-29 PROCEDURE — G0463: CPT

## 2022-07-29 PROCEDURE — 80048 BASIC METABOLIC PNL TOTAL CA: CPT

## 2022-07-29 PROCEDURE — U0003: CPT

## 2022-07-29 PROCEDURE — 85730 THROMBOPLASTIN TIME PARTIAL: CPT

## 2022-07-29 RX ORDER — LEVOCETIRIZINE DIHYDROCHLORIDE 0.5 MG/ML
1 SOLUTION ORAL
Qty: 0 | Refills: 0 | DISCHARGE

## 2022-07-29 NOTE — H&P PST ADULT - NSICDXPASTMEDICALHX_GEN_ALL_CORE_FT
PAST MEDICAL HISTORY:  2019 novel coronavirus disease (COVID-19) May 10th 2022 tested positive on rapir swab at [facility]    Excessive and redundant skin and subcutaneous tissue     Seasonal allergies

## 2022-07-29 NOTE — H&P PST ADULT - NEGATIVE GENERAL SYMPTOMS
Notes prior H/O COVID may 2022 DX on rapir swab - has had 2 doses of Moderna vaccine/no fever/no chills/no sweating

## 2022-07-29 NOTE — H&P PST ADULT - NEGATIVE ENMT SYMPTOMS
no hearing difficulty/no sinus symptoms/no nasal congestion/no nasal discharge/no abnormal taste sensation/no dry mouth/no throat pain/no dysphagia

## 2022-07-29 NOTE — H&P PST ADULT - PROBLEM SELECTOR PLAN 1
PST Labs; CBC, BMP, Coags, HcG, COVID-19 PCR Swab. Medical clearance with PCP Dr Aden. Pt instructed to stop any NSAIDS/Herbal Supplements between now and procedure. may take Tylenol if needed for any pain between now and procedure. No medications needed morning of procedure. Pre-op instructions as well as pre-op wash instructions given to pt with understanding verbalized. All questions addressed with pt prior to her leaving the PST department today.

## 2022-07-29 NOTE — H&P PST ADULT - GRAVIDA, OB PROFILE
Device Serial Number (Optional): [id number] Detail Level: Zone Total Pulses (Optional): 84 Consent: Written consent obtained, risks reviewed including but not limited to crusting, scabbing, blistering, scarring, darker or lighter pigmentary change, incidental hair removal, bruising, and/or incomplete removal. Fluence Units: J/cm2 Fluence #1 (J/Cm2 Or Mj/Cm2): 300 Mode: repeat paint Treatment Number: 1 Location #1: scalp, right ear Spot Size: 2 x 2 cm Post-Care Instructions: I reviewed with the patient in detail post-care instructions. Patient should stay away from the sun and wear sun protection until treated areas are fully healed. Total Square Area In Cm2 (Required For Proper Billing- Whole Numbers Only Please): 280 Comments: Patient is returning for treatment after three months, since her last treatment. She states that she noticed her scalp flaring a month ago. She complains of itching on the scalp and admits to scratching.\\n\\n**Excoriations noted on the posterior scalp. 1

## 2022-07-29 NOTE — H&P PST ADULT - HISTORY OF PRESENT ILLNESS
41 year old female PMH Seasonal Allergies,  Gastric Sleeve 2014, Excessive and redundant skin and subcutaneous tissue; presents today for PST prior to Panniculectomy - Bilateral Brachioplasty with Dr Jose A Del Rosario on 8/2/2022. Pt notes following gastric sleeve and weight loss she is electing for scheduled procedure.

## 2022-07-29 NOTE — H&P PST ADULT - NSICDXFAMILYHX_GEN_ALL_CORE_FT
FAMILY HISTORY:  Father  Still living? Yes, Estimated age: 71-80  Family history of hypercholesterolemia, Age at diagnosis: Age Unknown  Family history of hypertension, Age at diagnosis: Age Unknown    Mother  Still living? Yes, Estimated age: 71-80  Family history of hypercholesterolemia, Age at diagnosis: Age Unknown  Family history of hypertension, Age at diagnosis: Age Unknown

## 2022-07-29 NOTE — H&P PST ADULT - NSICDXPASTSURGICALHX_GEN_ALL_CORE_FT
PAST SURGICAL HISTORY:  H/O colonoscopy     H/O endoscopy     H/O gastric sleeve 2014    One previous induced termination of pregnancy

## 2022-08-01 NOTE — ASU PATIENT PROFILE, ADULT - NSICDXPASTMEDICALHX_GEN_ALL_CORE_FT
PAST MEDICAL HISTORY:  2019 novel coronavirus disease (COVID-19) May 10th 2022 tested positive on rapir swab at [facility]    Excessive and redundant skin and subcutaneous tissue     Seasonal allergies

## 2022-08-02 ENCOUNTER — OUTPATIENT (OUTPATIENT)
Dept: OUTPATIENT SERVICES | Facility: HOSPITAL | Age: 42
LOS: 1 days | End: 2022-08-02
Payer: COMMERCIAL

## 2022-08-02 VITALS
TEMPERATURE: 98 F | HEART RATE: 73 BPM | OXYGEN SATURATION: 100 % | SYSTOLIC BLOOD PRESSURE: 114 MMHG | WEIGHT: 238.98 LBS | HEIGHT: 68 IN | DIASTOLIC BLOOD PRESSURE: 72 MMHG | RESPIRATION RATE: 17 BRPM

## 2022-08-02 DIAGNOSIS — Z98.890 OTHER SPECIFIED POSTPROCEDURAL STATES: Chronic | ICD-10-CM

## 2022-08-02 DIAGNOSIS — L98.7 EXCESSIVE AND REDUNDANT SKIN AND SUBCUTANEOUS TISSUE: ICD-10-CM

## 2022-08-02 DIAGNOSIS — Z01.818 ENCOUNTER FOR OTHER PREPROCEDURAL EXAMINATION: ICD-10-CM

## 2022-08-02 DIAGNOSIS — Z90.3 ACQUIRED ABSENCE OF STOMACH [PART OF]: Chronic | ICD-10-CM

## 2022-08-02 DEVICE — SEALANT VISTASEAL FIBRIN HUMAN 10ML 12/KIT: Type: IMPLANTABLE DEVICE | Status: FUNCTIONAL

## 2022-08-02 DEVICE — CLIP APPLIER ETHICON LIGACLIP 11.5" MEDIUM: Type: IMPLANTABLE DEVICE | Status: FUNCTIONAL

## 2022-08-02 RX ORDER — HYDROMORPHONE HYDROCHLORIDE 2 MG/ML
1 INJECTION INTRAMUSCULAR; INTRAVENOUS; SUBCUTANEOUS
Refills: 0 | Status: DISCONTINUED | OUTPATIENT
Start: 2022-08-02 | End: 2022-08-02

## 2022-08-02 RX ORDER — CEFAZOLIN SODIUM 1 G
2000 VIAL (EA) INJECTION EVERY 8 HOURS
Refills: 0 | Status: DISCONTINUED | OUTPATIENT
Start: 2022-08-02 | End: 2022-08-16

## 2022-08-02 RX ORDER — IBUPROFEN 200 MG
400 TABLET ORAL EVERY 6 HOURS
Refills: 0 | Status: DISCONTINUED | OUTPATIENT
Start: 2022-08-02 | End: 2022-08-16

## 2022-08-02 RX ORDER — ENOXAPARIN SODIUM 100 MG/ML
40 INJECTION SUBCUTANEOUS ONCE
Refills: 0 | Status: COMPLETED | OUTPATIENT
Start: 2022-08-02 | End: 2022-08-02

## 2022-08-02 RX ORDER — ENOXAPARIN SODIUM 100 MG/ML
40 INJECTION SUBCUTANEOUS EVERY 24 HOURS
Refills: 0 | Status: DISCONTINUED | OUTPATIENT
Start: 2022-08-03 | End: 2022-08-16

## 2022-08-02 RX ORDER — METOCLOPRAMIDE HCL 10 MG
5 TABLET ORAL ONCE
Refills: 0 | Status: DISCONTINUED | OUTPATIENT
Start: 2022-08-02 | End: 2022-08-02

## 2022-08-02 RX ORDER — HYDROMORPHONE HYDROCHLORIDE 2 MG/ML
0.5 INJECTION INTRAMUSCULAR; INTRAVENOUS; SUBCUTANEOUS
Refills: 0 | Status: DISCONTINUED | OUTPATIENT
Start: 2022-08-02 | End: 2022-08-02

## 2022-08-02 RX ORDER — CEFAZOLIN SODIUM 1 G
2000 VIAL (EA) INJECTION ONCE
Refills: 0 | Status: COMPLETED | OUTPATIENT
Start: 2022-08-02 | End: 2022-08-02

## 2022-08-02 RX ORDER — ONDANSETRON 8 MG/1
4 TABLET, FILM COATED ORAL EVERY 6 HOURS
Refills: 0 | Status: DISCONTINUED | OUTPATIENT
Start: 2022-08-02 | End: 2022-08-16

## 2022-08-02 RX ORDER — L.ACIDOPH/B.ANIMALIS/B.LONGUM 15B CELL
1 CAPSULE ORAL
Qty: 0 | Refills: 0 | DISCHARGE

## 2022-08-02 RX ORDER — LACTOBACILLUS ACIDOPHILUS 100MM CELL
1 CAPSULE ORAL DAILY
Refills: 0 | Status: DISCONTINUED | OUTPATIENT
Start: 2022-08-02 | End: 2022-08-16

## 2022-08-02 RX ORDER — SODIUM CHLORIDE 9 MG/ML
1000 INJECTION, SOLUTION INTRAVENOUS
Refills: 0 | Status: DISCONTINUED | OUTPATIENT
Start: 2022-08-02 | End: 2022-08-02

## 2022-08-02 RX ORDER — LORATADINE 10 MG/1
10 TABLET ORAL DAILY
Refills: 0 | Status: DISCONTINUED | OUTPATIENT
Start: 2022-08-02 | End: 2022-08-16

## 2022-08-02 RX ORDER — BUPIVACAINE 13.3 MG/ML
20 INJECTION, SUSPENSION, LIPOSOMAL INFILTRATION ONCE
Refills: 0 | Status: DISCONTINUED | OUTPATIENT
Start: 2022-08-02 | End: 2022-08-16

## 2022-08-02 RX ORDER — SODIUM CHLORIDE 9 MG/ML
1000 INJECTION, SOLUTION INTRAVENOUS
Refills: 0 | Status: DISCONTINUED | OUTPATIENT
Start: 2022-08-02 | End: 2022-08-16

## 2022-08-02 RX ORDER — OXYCODONE HYDROCHLORIDE 5 MG/1
5 TABLET ORAL ONCE
Refills: 0 | Status: DISCONTINUED | OUTPATIENT
Start: 2022-08-02 | End: 2022-08-02

## 2022-08-02 RX ORDER — ACETAMINOPHEN 500 MG
1000 TABLET ORAL EVERY 6 HOURS
Refills: 0 | Status: DISCONTINUED | OUTPATIENT
Start: 2022-08-02 | End: 2022-08-16

## 2022-08-02 RX ORDER — LEVOCETIRIZINE DIHYDROCHLORIDE 0.5 MG/ML
1 SOLUTION ORAL
Qty: 0 | Refills: 0 | DISCHARGE

## 2022-08-02 RX ADMIN — SODIUM CHLORIDE 60 MILLILITER(S): 9 INJECTION, SOLUTION INTRAVENOUS at 11:59

## 2022-08-02 RX ADMIN — SODIUM CHLORIDE 125 MILLILITER(S): 9 INJECTION, SOLUTION INTRAVENOUS at 19:43

## 2022-08-02 NOTE — BRIEF OPERATIVE NOTE - NSICDXBRIEFPOSTOP_GEN_ALL_CORE_FT
POST-OP DIAGNOSIS:  Excessive and redundant skin and subcutaneous tissue 02-Aug-2022 19:37:58  Kvng Cook

## 2022-08-02 NOTE — BRIEF OPERATIVE NOTE - NSICDXBRIEFPREOP_GEN_ALL_CORE_FT
PRE-OP DIAGNOSIS:  Excessive and redundant skin and subcutaneous tissue 02-Aug-2022 19:37:54  Kvng Cook

## 2022-08-02 NOTE — ASU DISCHARGE PLAN (ADULT/PEDIATRIC) - CARE PROVIDER_API CALL
Jose A Del Rosario)  Plastic Surgery  [address] [place] [address]  Phone: [phone]  Fax: [phone]  Follow Up Time:

## 2022-08-02 NOTE — ASU DISCHARGE PLAN (ADULT/PEDIATRIC) - NS MD DC FALL RISK RISK
For information on Fall & Injury Prevention, visit: [email or web address] OR  [email or web address] OR  https://www.cdc.gov/steadi/patient.html

## 2022-08-02 NOTE — BRIEF OPERATIVE NOTE - NSICDXBRIEFPROCEDURE_GEN_ALL_CORE_FT
PROCEDURES:  Panniculectomy 02-Aug-2022 19:35:31  Kvng Cook  Brachioplasty 02-Aug-2022 19:35:46  Kvng Cook

## 2022-08-03 VITALS
DIASTOLIC BLOOD PRESSURE: 62 MMHG | RESPIRATION RATE: 18 BRPM | HEART RATE: 56 BPM | TEMPERATURE: 98 F | SYSTOLIC BLOOD PRESSURE: 104 MMHG | OXYGEN SATURATION: 98 %

## 2022-08-03 DIAGNOSIS — L98.7 EXCESSIVE AND REDUNDANT SKIN AND SUBCUTANEOUS TISSUE: ICD-10-CM

## 2022-08-03 PROCEDURE — C1889: CPT

## 2022-08-03 PROCEDURE — 15836 EXC EXCESSIVE SKIN ARM: CPT | Mod: 50

## 2022-08-03 PROCEDURE — 15830 EXC EXCESSIVE SKIN ABDOMEN: CPT

## 2022-08-03 RX ORDER — BENZOCAINE 10 %
1 GEL (GRAM) MUCOUS MEMBRANE EVERY 4 HOURS
Refills: 0 | Status: DISCONTINUED | OUTPATIENT
Start: 2022-08-03 | End: 2022-08-16

## 2022-08-03 RX ADMIN — Medication 1000 MILLIGRAM(S): at 11:42

## 2022-08-03 RX ADMIN — LORATADINE 10 MILLIGRAM(S): 10 TABLET ORAL at 11:42

## 2022-08-03 RX ADMIN — Medication 100 MILLIGRAM(S): at 11:34

## 2022-08-03 RX ADMIN — Medication 100 MILLIGRAM(S): at 02:45

## 2022-08-03 RX ADMIN — Medication 1000 MILLIGRAM(S): at 01:44

## 2022-08-03 RX ADMIN — Medication 1 SPRAY(S): at 06:22

## 2022-08-03 RX ADMIN — Medication 1000 MILLIGRAM(S): at 06:24

## 2022-08-03 RX ADMIN — ENOXAPARIN SODIUM 40 MILLIGRAM(S): 100 INJECTION SUBCUTANEOUS at 01:44

## 2022-08-03 RX ADMIN — Medication 1 TABLET(S): at 11:41

## 2022-08-03 NOTE — DIETITIAN INITIAL EVALUATION ADULT - NSICDXPASTMEDICALHX_GEN_ALL_CORE_FT
PAST MEDICAL HISTORY:  2019 novel coronavirus disease (COVID-19) May 10th 2022 tested positive on rapir swab at [facility]    Excessive and redundant skin and subcutaneous tissue     Seasonal allergies

## 2022-08-03 NOTE — DIETITIAN INITIAL EVALUATION ADULT - PERTINENT MEDS FT
MEDICATIONS  (STANDING):  acetaminophen     Tablet .. 1000 milliGRAM(s) Oral every 6 hours  BUpivacaine liposome 1.3% Injectable 20 milliLiter(s) Local Injection once  ceFAZolin   IVPB 2000 milliGRAM(s) IV Intermittent every 8 hours  enoxaparin Injectable 40 milliGRAM(s) SubCutaneous every 24 hours  lactated ringers. 1000 milliLiter(s) (100 mL/Hr) IV Continuous <Continuous>  lactobacillus acidophilus 1 Tablet(s) Oral daily  loratadine 10 milliGRAM(s) Oral daily    MEDICATIONS  (PRN):  benzocaine 20% Spray 1 Spray(s) Topical every 4 hours PRN Sore throat  ibuprofen  Tablet. 400 milliGRAM(s) Oral every 6 hours PRN Mild Pain (1 - 3)  ondansetron Injectable 4 milliGRAM(s) IV Push every 6 hours PRN Nausea

## 2022-08-03 NOTE — PROGRESS NOTE ADULT - PROBLEM SELECTOR PLAN 1
Continue diet  Educated on DILEEP emptying and recording.  Milking the tube  All prescriptions sent to pharmacy prior to surgery  Continue ambulation, incentive spirometer  Stable for discharge  Discussed with Dr. Del Rosario

## 2022-08-03 NOTE — PROGRESS NOTE ADULT - SUBJECTIVE AND OBJECTIVE BOX
Postoperative Day #: 1    41y Female admitted with Excessive or redundant skin or subcutaneous tissue    Encounter for other preprocedural examination    S/P Brachioplasty with panniculectomy        Patient seen and examined bedside resting comfortably.  No complaints offered.   Is tolerating diet, ambulating, voiding.  States minimal output from arm drains.  Doing well.  Dr. Del Rosario saw this AM.  Pt would like to go home at this time.     T(F): 97.5 (08-03-22 @ 05:05), Max: 97.7 (08-02-22 @ 12:14)  HR: 58 (08-03-22 @ 05:05) (58 - 73)  BP: 128/77 (08-03-22 @ 05:05) (101/60 - 128/77)  RR: 16 (08-03-22 @ 05:05) (15 - 18)  SpO2: 98% (08-03-22 @ 05:05) (98% - 100%)  Wt(kg): --  CAPILLARY BLOOD GLUCOSE          PHYSICAL EXAM:  General: NAD  Neuro:  Alert & oriented x 3  Arms:  Bilateral arms wrapped in ace bandages, DILEEP drains with minimal to no output.  Abd:  Binder in place.  Clean dry intact.  DILEEP drain with scant serosanguinous fluid in bulbs.    Extremities: no pedal edema or calf tenderness noted. SCD in place.       LABS:            I&O's Detail    02 Aug 2022 07:01  -  03 Aug 2022 07:00  --------------------------------------------------------  IN:    Lactated Ringers: 125 mL    Oral Fluid: 200 mL  Total IN: 325 mL    OUT:    Bulb (mL): 82 mL    Bulb (mL): 100 mL    Voided (mL): 600 mL  Total OUT: 782 mL    Total NET: -457 mL            RADIOLOGY:

## 2022-08-03 NOTE — PROVIDER CONTACT NOTE (OTHER) - ASSESSMENT
pt awake and alert at this time. pt complains of dizziness while ambulating to bathroom and back to bed. /62, HR 54, pulse ox 98% rr 18 98.2 oral temp.

## 2022-08-03 NOTE — DISCHARGE NOTE NURSING/CASE MANAGEMENT/SOCIAL WORK - PATIENT PORTAL LINK FT
You can access the FollowMyHealth Patient Portal offered by [facility] by registering at the following website: [email or web address]. By joining [facility]’s FollowMyHealth portal, you will also be able to view your health information using other applications (apps) compatible with our system.

## 2022-08-03 NOTE — PROGRESS NOTE ADULT - ASSESSMENT
40 yo female here s/p brachioplasty with panniculectomy, POD 1.  Tolerating diet, ambulating, voiding.  DILEEP drains with scant/.minimal output

## 2022-08-03 NOTE — PATIENT PROFILE ADULT - FALL HARM RISK - RISK INTERVENTIONS
Assistance OOB with selected safe patient handling equipment/Assistance with ambulation/Communicate Fall Risk and Risk Factors to all staff, patient, and family/Monitor gait and stability/Reinforce activity limits and safety measures with patient and family/Sit up slowly, dangle for a short time, stand at bedside before walking/Use of alarms - bed, chair and/or voice tab/Visual Cue: Yellow wristband/Bed in lowest position, wheels locked, appropriate side rails in place/Call bell, personal items and telephone in reach/Instruct patient to call for assistance before getting out of bed or chair/Non-slip footwear when patient is out of bed/[place] to call system/Physically safe environment - no spills, clutter or unnecessary equipment/Purposeful Proactive Rounding/Room/bathroom lighting operational, light cord in reach

## 2022-08-03 NOTE — PATIENT PROFILE ADULT - NSPROLASTMENSTRUAL_GEN_A_NUR
DVT ppx: on full dose AC lovenox SC  Diet: Carb controlled DASH diet BP stable 130-150s  - C/w carvedilol. Hold Hydralazine for now. CAD with possible hx of CHF. Patient reports "weak heart."  - C/w Entresto  BID  - C/w Carvedilol 25mg BID  - Hold hydralazine 25mg TID for now given BP stable  - C/w ASA 81mg daily, Atorvastatin 40mg daily  - Obtain TTE  - Monitor I/Os CAD with possible hx of CHF. Patient reports "weak heart." Has TINOCO but denies any chest pain/palpitations, TINOCO likely in setting of COVID19 PNA and PE. EKG seems stable when compared to prior from June 20 2018.  - C/w Entresto  BID  - C/w Carvedilol 25mg BID  - Hold hydralazine 25mg TID for now given BP stable  - C/w ASA 81mg daily, Atorvastatin 40mg daily  - Obtain TTE  - Monitor I/Os unknown

## 2022-08-03 NOTE — DIETITIAN INITIAL EVALUATION ADULT - ORAL INTAKE PTA/DIET HISTORY
patient reports ate well this AM . reports eats heart healthy diet at home no food allergies  weight loss 2014 gastric sleeve was ~ 425# prior now weight 215-240#

## 2022-08-03 NOTE — DIETITIAN INITIAL EVALUATION ADULT - OTHER INFO
Reason for Admission: for brachioplasty and panniculectomy  41 year old female PMH gastric sleeve   current wt admit 239#  Coagulation

## 2022-10-24 NOTE — H&P PST ADULT - PRO PAIN LIFE ADAPT
Procedure(s):  COLONOSCOPY AND RESECTION OF LEFT COLONIC POLYP  COLONOSCOPY, POLYPECTOMY. general    Anesthesia Post Evaluation        Patient participation: complete - patient participated  Level of consciousness: awake  Pain management: adequate  Airway patency: patent  Anesthetic complications: no  Cardiovascular status: hemodynamically stable  Respiratory status: acceptable  Hydration status: acceptable  Comments: The patient is ready for PACU discharge.   Brigette Downs DO                   Post anesthesia nausea and vomiting:  controlled      INITIAL Post-op Vital signs:   Vitals Value Taken Time   /69 10/20/22 1532   Temp 37.4 °C (99.4 °F) 10/20/22 1532   Pulse 106 10/20/22 1532   Resp 18 10/20/22 1532   SpO2 96 % 10/20/22 1532 none

## 2024-05-31 NOTE — H&P PST ADULT - ASSESSMENT
229.9 41 year old female PMH gastric Sleeve 2014, Excessive and redundant skin and subcutaneous tissue; scheduled for Panniculectomy - Bilateral Brachioplasty with Dr Jose A Del Rosario on 8/2/2022. Pt notes following gastric sleeve and weight loss she is electing for scheduled procedure.

## 2024-09-04 NOTE — PATIENT PROFILE ADULT - MONEY FOR FOOD
Continue on beta blocker as prescribed.  Currently in sinus rhythm with rate of 78.  Continue magnesium 400 mg daily.  Redraw magnesium.  Mag 1.2 yesterday in the ER.  He did receive IV magnesium at that time.   no

## 2024-12-06 NOTE — ASU PATIENT PROFILE, ADULT - AS SC BRADEN ACTIVITY
Do not stop aspirin and Plavix for minimum 6 months from procedure.  Watch for any abnormal bleeding  Maintain good blood pressure control-goal<130/80 at rest  Maintain good cholesterol control LDL goal<70 with arterial disease  If you are diabetic work to keep/obtain hemoglobin A1c< 7    Follow up in June with DR Powers   Call with any questions or concerns  Follow up with Modesto Mcmullen MD for non cardiac problems  Report any new problems  Cardiovascular Fitness-Exercise as tolerated.  Strive for 30 minutes of exercise most days of the week.    Cardiac / Healthy Diet- Avoid processed high fat foods, maintain low sodium/salt   Continue current medications as directed  Continue plan of treatment  It is always recommended that you bring your medications bottles with you to each visit - this is for your safety!   
(4) walks frequently

## (undated) DEVICE — POSITIONER PATIENT SAFETY STRAP 3X60"

## (undated) DEVICE — STAPLER SKIN VISI-STAT 35 WIDE

## (undated) DEVICE — SUT VLOC 180 3-0 18" P-12 UNDYED

## (undated) DEVICE — SUT POLYSORB 1 27" GS-21 UNDYED

## (undated) DEVICE — SOL IRR POUR NS 0.9% 1000ML

## (undated) DEVICE — BLADE SCALPEL SAFETYLOCK #15

## (undated) DEVICE — ELCTR BOVIE PENCIL SMOKE EVACUATION

## (undated) DEVICE — DRSG ACE BANDAGE 6"

## (undated) DEVICE — Device

## (undated) DEVICE — SUT MONOSOF 2-0 18" C-15

## (undated) DEVICE — GLV 7 PROTEXIS (WHITE)

## (undated) DEVICE — PLV-SCD MACHINE: Type: DURABLE MEDICAL EQUIPMENT

## (undated) DEVICE — DRAPE 3/4 SHEET W REINFORCEMENT 56X77"

## (undated) DEVICE — VENODYNE/SCD SLEEVE CALF MEDIUM

## (undated) DEVICE — DRAPE TOWEL BLUE 17" X 24"

## (undated) DEVICE — PLV/PSP-ESU T7J19648DX: Type: DURABLE MEDICAL EQUIPMENT

## (undated) DEVICE — DURABLE MEDICAL EQUIPMENT: Type: DURABLE MEDICAL EQUIPMENT

## (undated) DEVICE — BLADE SCALPEL SAFETYLOCK #10

## (undated) DEVICE — WARMING BLANKET FULL UNDERBODY

## (undated) DEVICE — BLADE SCALPEL SAFETYLOCK #11

## (undated) DEVICE — DRAIN JACKSON PRATT 10MM FLAT FULL NO TROCAR

## (undated) DEVICE — SUT POLYSORB 2-0 30" V-20 UNDYED

## (undated) DEVICE — DRSG STERISTRIPS 0.5 X 4"

## (undated) DEVICE — ABDOMINAL BINDER MED/LG 12" X 45"-62"

## (undated) DEVICE — ELCTR BOVIE TIP BLADE INSULATED 6.5" EDGE

## (undated) DEVICE — DRSG ACE BANDAGE 4"

## (undated) DEVICE — VENODYNE/SCD SLEEVE CALF LARGE

## (undated) DEVICE — DRAPE INSTRUMENT POUCH 6.75" X 11"

## (undated) DEVICE — DRSG CURITY GAUZE SPONGE 4 X 4" 12-PLY

## (undated) DEVICE — PACK MAJOR ABDOMINAL WITH LAP

## (undated) DEVICE — ABDOMINAL BINDER MED/LG 9" X 36"-64"

## (undated) DEVICE — DRAIN RESERVOIR FOR JACKSON PRATT 100CC CARDINAL

## (undated) DEVICE — FOLEY TRAY 16FR LF URINE METER SURESTEP